# Patient Record
Sex: MALE | Race: WHITE
[De-identification: names, ages, dates, MRNs, and addresses within clinical notes are randomized per-mention and may not be internally consistent; named-entity substitution may affect disease eponyms.]

---

## 2018-04-05 ENCOUNTER — HOSPITAL ENCOUNTER (INPATIENT)
Dept: HOSPITAL 68 - ERH | Age: 58
LOS: 1 days | DRG: 313 | End: 2018-04-06
Attending: INTERNAL MEDICINE | Admitting: INTERNAL MEDICINE
Payer: COMMERCIAL

## 2018-04-05 VITALS — DIASTOLIC BLOOD PRESSURE: 50 MMHG | SYSTOLIC BLOOD PRESSURE: 110 MMHG

## 2018-04-05 VITALS — SYSTOLIC BLOOD PRESSURE: 104 MMHG | DIASTOLIC BLOOD PRESSURE: 56 MMHG

## 2018-04-05 VITALS — HEIGHT: 71 IN | BODY MASS INDEX: 29.12 KG/M2 | WEIGHT: 208 LBS

## 2018-04-05 DIAGNOSIS — K21.9: ICD-10-CM

## 2018-04-05 DIAGNOSIS — D72.829: ICD-10-CM

## 2018-04-05 DIAGNOSIS — R07.89: Primary | ICD-10-CM

## 2018-04-05 DIAGNOSIS — R73.03: ICD-10-CM

## 2018-04-05 DIAGNOSIS — F41.9: ICD-10-CM

## 2018-04-05 DIAGNOSIS — R20.2: ICD-10-CM

## 2018-04-05 DIAGNOSIS — M19.90: ICD-10-CM

## 2018-04-05 LAB
ABSOLUTE GRANULOCYTE CT: 10.1 /CUMM (ref 1.4–6.5)
APTT BLD: 30 SEC (ref 25–37)
BASOPHILS # BLD: 0 /CUMM (ref 0–0.2)
BASOPHILS NFR BLD: 0.1 % (ref 0–2)
EOSINOPHIL # BLD: 0 /CUMM (ref 0–0.7)
EOSINOPHIL NFR BLD: 0.1 % (ref 0–5)
ERYTHROCYTE [DISTWIDTH] IN BLOOD BY AUTOMATED COUNT: 12.6 % (ref 11.5–14.5)
GRANULOCYTES NFR BLD: 86.5 % (ref 42.2–75.2)
HCT VFR BLD CALC: 44.8 % (ref 42–52)
LYMPHOCYTES # BLD: 1.1 /CUMM (ref 1.2–3.4)
MCH RBC QN AUTO: 30 PG (ref 27–31)
MCHC RBC AUTO-ENTMCNC: 34.8 G/DL (ref 33–37)
MCV RBC AUTO: 86.4 FL (ref 80–94)
MONOCYTES # BLD: 0.4 /CUMM (ref 0.1–0.6)
PLATELET # BLD: 227 /CUMM (ref 130–400)
PMV BLD AUTO: 9 FL (ref 7.4–10.4)
PROTHROMBIN TIME: 11.1 SEC (ref 9.4–12.5)
RED BLOOD CELL CT: 5.18 /CUMM (ref 4.7–6.1)
WBC # BLD AUTO: 11.7 /CUMM (ref 4.8–10.8)

## 2018-04-05 NOTE — PN- ATT ADDEND
Attending Addendum
Attending Brief Note
Patient seen and examined.  Pleasant 58-year-old male with no significant 
medical history.  Presents to the emergency room complaining of retrosternal 
discomfort which she describes as burning sensation.  Also treated with symptoms
of bilateral lower chest wall pain.  Symptoms began suddenly this morning.  Did 
not but after taking Tums.  Acute emergency room for further evaluation with 
symptoms persisted despite nitroglycerin and PPI therapy.  EKG showed normal 
sinus rhythm with no obvious ischemic changes.  His first troponin Was negative.
 Patient reports family history of coronary artery disease wasreferred to the 
medical service for further evaluation and management.  On examination is 
resting comfortably multivitamin acute distress.  Chest pain is not 
reproducible.  Heart sounds are regular.  Lungs are clear bilaterally.  Patient 
reports similar pain in the past that improved with the use of Tums.  He admits 
to having a heavy meal last night.  He describes sensation as a burning 
sensation.
 
 
Problems:
1.  Chest pain; likely related to GERD.
 
 
Recommendations:
-Trend cardiac enzymes.
-Obtain echocardiogram to rule out any wall motion abnormality.
-Patient had a nuclear stress test 3 years ago for similar complaints that was 
negative.  Follow-up his cardiologist regarding the need for repeating a nuclear
stress test.
-Cardiac enzymes are negative and following evaluation by his cardiologist 
service tomorrow he may be discharged home with recommendations to follow with 
the gastroenterology service in 2 weeks if symptoms persist despite PPI therapy.
-He is leukocytosis is likely reactive.  Repeat CBC in a.m.

## 2018-04-05 NOTE — RADIOLOGY REPORT
EXAMINATION:
CHEST 2 VIEWS
 
CLINICAL INFORMATION:
Chest pain.
 
COMPARISON:
04/17/2015.
 
TECHNIQUE:
PA and lateral views of the chest were obtained.
 
FINDINGS:
The cardiac silhouette is not enlarged. The mediastinal and hilar contours
are unremarkable. There are neither pleural effusions nor pneumothoraces.
There are no consolidations. The osseous structures are unremarkable.
 
IMPRESSION:
No evidence for acute disease.

## 2018-04-05 NOTE — ED CARDIAC/CP/PALPITATIONS
History of Present Illness
 
General
Chief Complaint: Chest Pain
Stated Complaint: CP
Source: patient
Exam Limitations: no limitations
 
Vital Signs & Intake/Output
Vital Signs & Intake/Output
 Vital Signs
 
 
Date Time Temp Pulse Resp B/P B/P Pulse O2 O2 Flow FiO2
 
     Mean Ox Delivery Rate 
 
 1420  72  118/69     
 
 1350 98.9 82 18 122/71  95 Room Air  
 
 1300 99.0        
 
 1247  72 24 120/74  95 Room Air  
 
 
 
Allergies
Coded Allergies:
NO KNOWN ALLERGIES (16)
  [facility] PER ANITBIOTIC ORDER SHEET -SJS
 
Reconcile Medications
No Known Home Medications
 
Triage Nurses Notes Reviewed? yes
Onset: Gradual
Duration: better
Timing: single episode today
Radiation: back
HPI:
Patient is a 58-year-old male with an unremarkable past medical history presents
emergency room saying that last night he was in his normal state of health this 
morning patient was woke up with mild nausea nausea and which she did eat a half
a bagel prior to going to work patient states that while at work symptoms still 
persisted where he felt cold and clammy patient states that he drank a seltzer 
water and had acute onset approximately 15 minutes later of substernal chest and
throat burning sensation patient then subsequently had chest tightness with 
intermittent back pain described as "shock" symptoms patient took Tums prior to 
arrival with no relief of symptoms, patient states that currently the chest 
tightness is gone however is still complaining of throat and substernal burning 
sensation
Patient denies any alcohol or tobacco use or illicit drug use
Denies any fever chills cough hemoptysis leg swelling shortness of breath or arm
pain jaw pain history of DVT or PE
Patient does have a family history of cardiac disease
 
(Tung Castrejon)
 
Past History
 
Medical History
Any Pertinent Medical History? see below for history
Neurological: PARESTHESIA
Psychiatric: anxiety
Endocrine: BORDERLINE DIABETIC
 
Surgical History
Surgical History: N
 
Psychosocial History
What is your primary language English
 
Family History
Hx Contributory? No
(Tung Castreojn)
 
Review of Systems
 
Review of Systems
Constitutional:
Reports: no symptoms. 
EENTM:
Reports: no symptoms. 
Respiratory:
Reports: see HPI. 
Cardiovascular:
Reports: see HPI. 
GI:
Reports: see HPI, nausea. 
Genitourinary:
Reports: no symptoms. 
Musculoskeletal:
Reports: no symptoms. 
Skin:
Reports: no symptoms. 
Neurological/Psychological:
Reports: no symptoms. 
Hematologic/Endocrine:
Reports: no symptoms. 
Immunologic/Allergic:
Reports: no symptoms. 
All Other Systems: Reviewed and Negative
(Tung Castrejon)
 
Physical Exam
 
Physical Exam
General Appearance: no apparent distress, alert, comfortable
Head: atraumatic
Eyes:
Bilateral: normal appearance, PERRL. 
Ears, Nose, Throat: normal pharynx, normal ENT inspection
Neck: normal inspection
Respiratory: normal breath sounds, chest non-tender, no respiratory distress
Cardiovascular: regular rate/rhythm
Peripheral Pulses:
2+ radial (R)
Gastrointestinal: normal bowel sounds, soft, non-tender
Extremities: normal inspection, normal capillary refill
Skin: intact, normal color, warm/dry
 
Core Measures
ACS in differential dx? Yes
CVA/TIA Diagnosis No
Sepsis Present: No
Sepsis Focused Exam Completed? No
(Tung Castrejon)
 
Progress
Differential Diagnosis: AMI, aortic dissection, atrial fibrillation, 
cholecystitis, CHF/pulm edema, costochondritis, hyperkalemia, hypovolemia, 
hyperthyroid, hyperventilation, intracranial hemorrhage, musculoskeletal pain, 
myocarditis, pancreatitis, pericarditis, pneumonia, pneumothorax, PSVT, 
pulmonary embolism, PUD/GERD, PVCs/PACs, respiratory failure, sepsis, unstable 
angina, V-fib/V-Tach, WPW syndrome
Plan of Care:
 Orders
 
 
Procedure Date/time Status
 
Heart Healthy Diet  D Active
 
Patient Data  1543 Active
 
ED Holding Orders  1542 Active
 
Admit to inpatient  1542 Active
 
Vital Signs  1542 Active
 
Code Status  1542 Active
 
Add-on Test (ER Only)  1353 Active
 
D-DIMER  1248 Complete
 
TROPONIN LEVEL  1245 Complete
 
PARTIAL THROMBOPLASTIN TIME  1245 Complete
 
PROTHROMBIN TIME  1245 Complete
 
LACTIC ACID  1245 Complete
 
D-DIMER  1245 Complete
 
COMPREHENSIVE METABOLIC PANEL  1245 Complete
 
CBC WITHOUT DIFFERENTIAL  1245 Complete
 
TYPE & SCREEN (NOT X-MATCH)  1245 Complete
 
EKG  1235 Active
 
 
 Current Medications
 
 
  Sig/Sarita Start time  Last
 
Medication Dose  Stop Time Status Admin
 
Omeprazole 20 MG DAILY AC  1631 UNVr 
 
(Prilosec)     1640
 
Nitroglycerin 0.4 MG ONCE ONE  1415 CAN 
 
(Nitrostat)    1416  
 
 
 Laboratory Tests
 
 
 
18 1545:
Lactic Acid Cancelled
 
18 1351:
D-Dimer High Sensitivty < 200
 
18 1245:
Anion Gap 14, Estimated GFR > 60, BUN/Creatinine Ratio 22.9, Glucose 111  H, 
Lactic Acid 1.1, Calcium 9.8, Total Bilirubin 1.0, AST 21, ALT 34, Alkaline 
Phosphatase 98, Troponin I < 0.01, Total Protein 7.9, Albumin 4.7, Globulin 3.2,
Albumin/Globulin Ratio 1.5, PT 11.1, INR 1.02, APTT 30, D-Dimer High Sensitivty 
< 200, CBC w Diff MAN DIFF ORDERED, RBC 5.18, MCV 86.4, MCH 30.0, MCHC 34.8, RDW
12.6, MPV 9.0, Gran % 86.5  H, Lymphocytes % 9.7  L, Monocytes % 3.6, 
Eosinophils % 0.1, Basophils % 0.1, Absolute Granulocytes 10.1  H, Absolute 
Lymphocytes 1.1  L, Absolute Monocytes 0.4, Absolute Eosinophils 0, Absolute 
Basophils 0, Platelet Estimate ADEQUATE, Normocytic RBCs VERIFIED, Normochromic 
RBCs VERIFIED
Initial examination patient is resting comfortably at bedside EKG indicates 
status and 73 bpm telemetry monitor placed patient was prophylactically 
administered aspirin due to presentation of chest pain due to onset of 
substernal esophageal and chest burning sensation after drinking seltzer water 
GI cocktail was first given which he had no change in symptoms 1353 
nitroglycerin was administered and will be reevaluated
 
Nitroglycerin was administered patient states that his symptoms were worsened 
nitroglycerin ointment was GIVEN
 
After nitroglycerin was administered patient did have improvement of his 
symptoms however still complaining of 4/10 substernal chest burning pain
 
Dr. Singh discussed patient with Dr. Davis who advised patient to be 
admitted for concerns of angina
 
Discussed admission with patient who is aware
Diagnostic Imaging:
Viewed by Me: Radiology Read. 
CXR Impression: no acute abnormality, no infiltrates
Initial ED EKG: normal p-waves, normal QRS complex, normal sinus rhythm, 73 BPM,
NSR
Comments:
PATIENT: ANJUM CURRAN  MEDICAL RECORD NO: [id number]
PRESENT AGE: 58  PATIENT ACCOUNT NO: [id number]
: 60  LOCATION: ER
ORDERING PHYSICIAN: Tung MARCOS  
 
  SERVICE DATE: 
EXAM TYPE: RAD - XRY-CHEST XRAY, TWO VIEWS
 
EXAMINATION:
CHEST 2 VIEWS
 
CLINICAL INFORMATION:
Chest pain.
 
COMPARISON:
2015.
 
TECHNIQUE:
PA and lateral views of the chest were obtained.
 
FINDINGS:
The cardiac silhouette is not enlarged. The mediastinal and hilar contours
are unremarkable. There are neither pleural effusions nor pneumothoraces.
There are no consolidations. The osseous structures are unremarkable.
 
IMPRESSION:
No evidence for acute disease.
 
DICTATED BY: Raghavendra Bhatia MD 
DATE/TIME DICTATED:18
:RAD.DOMINGO 
DATE/TIME TRANSCRIBED:18
 
CONFIDENTIAL, DO NOT COPY WITHOUT APPROPRIATE AUTHORIZATION.
 
 <Electronically signed in Other Vendor System>                                 
          
                                           SIGNED BY: Raghavendra Bhatia MD 18
 
 
(Tung Castrejon)
 
Departure
 
Departure
Disposition: STILL A PATIENT
Condition: Stable
Clinical Impression
Primary Impression: Angina at rest
Referrals:
Henry Real MD (PCP/Family)
 
Departure Forms:
Customer Survey
General Discharge Information
Prescriptions:
Current Visit Scripts
No Known Home Medications     
 
 
Admission Note
Spoke With:
Yamile Green MD
Documentation of Exam:
Documentation of any treatments & extenuating circumstances including Concerns 
Regarding Discharge (functional status, medication knowledge or non-compliance, 
living conditions, etc.) that warrant an admission rather than observation: [
Patient requires admission for concerns of persistent angina unrelieved with 
nitroglycerin patient requires repeat EKG blood work troponin cardiology 
consultation and telemetry monitoring]
 
(Tung Castrejon)
 
PA/NP Co-Sign Statement
Statement:
ED Attending supervision documentation-
 
[X] I saw and evaluated the patient. I have also reviewed all the pertinent lab 
results and diagnostic results. I agree with the findings and the plan of care 
as documented in the PA's/NP's documentation. 
 
[X] I have reviewed the ED Record and agree with the PA's/NP's documentation.
 
[] Additions or exceptions (if any) to the PAs/NP's note and plan are 
summarized below:
[ADMIT FOR TELE, SEIRAL ENZYMES,CARDIOLOGY CONSULTATION]
 
(Samantha ESTRADA,[employer] FRIEND)
 
Critical Care Note
 
Critical Care Note
Critical Care Time: 30-74 min
(Tung Castrejon)

## 2018-04-05 NOTE — CONS- CARDIOLOGY
General Information and HPI
 
Consulting Request
Date of Consult: 04/05/18
Requested By:
Yamile Green MD
 
History of Present Illness:
Giovani is a 58 year old male with a family history of coronary artery disease. 
He is at least moderately active and at his baseline he is active is free of any
chest pain, pressure, tightness, shortness of breath or lightheadedness. This 
morning, the patient noted nausea followed by diaphoresis. He subsequently felt 
a midsternal burning sensation along with discomfort bilaterally in his lower 
chest. The discomfort radiated to his back. The discomfort is now in his upper 
mid chest at the base of his throat.There is no clear relief by NTG. Otherwise 
he denies shortness of breath, lightheadedness or palpitations.  
 
To review this patient's prior history, Giovani has noted some minor palpitations
for about eight years but they typically only occured on rare occasion. A couple
visits ago he reported having much more prominent palpitations but they seem to 
have almost completely abated. He previously complained of fleeting discomfort 
in a variety of different areas such as his foot to his face. Neurology has not 
been able to discover a reason for these symptoms. In may be recalled that when 
this patient had his prior palpitationsthey were described as intermittent, 
palpitations that were associated with mild shortness of breath and 
lightheadedness. They tend to be short runs that are associated with neck 
pulsation and are described as being fast and even. He did have resolution soon 
after drinking a glass of water.
 
He had an echocardiogram that showed a normal EF of 67% with moderate aortic 
regurgitation, mild to moderate pulmonic regurgitation and borderline LVH. His 
stress test is negative for ischemia.
 
 
 
Allergies/Medications
Allergies:
Coded Allergies:
NO KNOWN ALLERGIES (05/11/16)
  [facility] PER ANITBIOTIC ORDER SHEET -hospitals
 
Home Med List:
No Known Home Medications
 
 
Review of Systems
Review of Systems:
A review of systems is unremarkable.
 
Past History
 
Travel History
Traveled to [place] past 21 day No
 
Medical History
Neurological: PARESTHESIA
EENT: NONE
Cardiovascular: NONE
Respiratory: NONE
Gastrointestinal: NONE
Hepatic: NONE
Renal: NONE
Musculoskeletal: NONE
Psychiatric: anxiety
Endocrine: BORDERLINE DIABETIC
 
Surgical History
Surgical History: none
 
Exam & Diagnostic Data
Vital Signs and I&O
Vital Signs
 
 
Date Time Temp Pulse Resp B/P B/P Pulse O2 O2 Flow FiO2
 
     Mean Ox Delivery Rate 
 
04/05 2034 98.6 65 20 110/50  95 Room Air  
 
04/05 1808      95 Room Air  
 
04/05 1736 99.6        
 
04/05 1714 99.6 84 18 112/67  95 Room Air  
 
04/05 1420  72  118/69     
 
04/05 1350 98.9 82 18 122/71  95 Room Air  
 
04/05 1300 99.0        
 
04/05 1247  72 24 120/74  95 Room Air  
 
 
 Intake & Output
 
 
 04/05 1600 04/05 0800 04/05 0000 04/04 1600 04/04 0800 04/04 0000
 
Intake Total 10     
 
Output Total      
 
Balance 10     
 
       
 
Intake, IV 10     
 
Patient 212 lb     
 
Weight      
 
Weight Reported by Patient     
 
Measurement      
 
Method      
 
 
 
Physical Exam:
General: WD/WN male in NAD; alert and oriented x3
HEENT: NC//AT, PERRL, EOMI
Neck: no JVD, no carotid bruits
Heart: RRR w/o murmur
Lungs: clear bilaterally
Abdomen: soft, NT, +ve bowel sounds
Extremities: no edema
 
Assessment/Plan
Assessment/Plan
* This patient has chest discomfort that is atypical in location without any 
exacerbation by physical exertion or convincing relief by NTG. We will monitor 
him on telelmetry and will rule him out for an MI. If he has recurrent chest 
pain then we will begin IV heparin and will add NTG paste 1/2 in Q 6 hours. If 
the patient rules out and is pain free then we will discharge with a plan for 
outpatient stress testing. If his chest discomfort persists then stress testing 
will be done as an inpatient. 
* Repeat the patient's CBC.
* Continue usual home medications.
 
 
Consult Acknowledgment
- Thank you for your consult request.

## 2018-04-05 NOTE — HISTORY & PHYSICAL
NicholasNovinger 04/05/18 1549:
General Information and HPI
MD Statement:
I have seen and personally examined ANJUM CURRAN and documented this H&P.
 
The patient is a 58 year old M who presented with a patient stated chief 
complaint of chest discomfort, nausea and sweating since this morning.  [].
 
Source of Information: patient, old records
Exam Limitations: no limitations
History of Present Illness:
59 YO M with PMH of acid reflex, anxiety, paresthesia, prediabetic not on any 
medication, osteoarthritis, left knee meniscal tear status post repair and 
colonoscopy in 2008 for rectal bleeding came to ED with chief complaint of chest
discomfort, nausea and sweating since this morning.  Patient reported that he 
was in his usual state of health since this morning when he woke up he noticed 
nausea and sweating.  According to patient he took the shower and went to his 
work where he drank seltzer water and he noticed retrosternal burning sensation 
that's coming to his throat.  He took 2 tums but that didn't relieve his burning
sensation.  Later on patient noticed tightness around his lower chest and also 
shooting pain around his neck.  That tightness was 5/10, nonradiating, 
intermittent, not aggravated with exertion and relieved a little bit with rest. 
Patient also noticed having nausea along with this lower chest tightness.  
Patient decided to leave his work and came to ED for further evaluation.
Patient denied any central chest pain, shortness of breath, palpitation, 
increase in chest tightness with breathing, recent upper respiratory tract 
infection, lightheadedness, vomiting, abdominal pain, diarrhea and dysuria.
Patient also noticed having bloating sensation in his belly.  Patient reported 
that he ate hot dogs and fried chips last night before he slept.  Patient 
reported that similar episode he noticed 3 years back and he took Tums at that 
time and that relieved his burning sensation.  Patient also endorsed that he had
episode of palpitation 2 years back and since then he is seeing Dr. Davis.  He
had 24-hour Holter monitor but nothing was diagnosed at that point.  He also had
stress test in 2016 that was negative.  Last time he saw Dr. Davis in July 2017.  Patient is also seeing Dr. Major for paresthesia around his face and in 
the hands.  Patient attributed this paresthesia to anxiety.  According to 
patient he received nitroglycerin tablet in ED that worsened his retrosternal 
burning sensation.
 
ED course:
Vitals: Temperature 99.0, pulse 72, respiratory rate 24, blood pressure 120/74, 
oxygen saturation 95% room air.
 
Labs: WBC count 11.7, hemoglobin 15.6, hematocrit 44.8, platelet count 227, 
sodium 139, potassium 4.3, BUN 16, creatinine 0.7, anion gap 14, BUNs/creatinine
ratio 22.9, troponin less than 0.01, albumin 4.7, globulin 3.2, PT 11.1, INR 
1.02, d-dimer less than 200
 
Patient received aspirin in ED also nitroglycerin patch and pill.  He also 
received GI cocktail.
 
 
 
 
 
Allergies/Medications
Allergies:
Coded Allergies:
NO KNOWN ALLERGIES (05/11/16)
  NKA PER ANITBIOTIC ORDER SHEET -SJS
 
 
Past History
 
Travel History
Traveled to [place] past 21 day No
 
Medical History
Neurological: PARESTHESIA
EENT: NONE
Cardiovascular: NONE
Respiratory: NONE
Gastrointestinal: NONE
Hepatic: NONE
Renal: NONE
Musculoskeletal: NONE
Psychiatric: anxiety
Endocrine: BORDERLINE DIABETIC
 
Surgical History
Surgical History: N
 
Review of Systems
 
Review of Systems
Constitutional:
Reports: no symptoms. 
EENTM:
Reports: no symptoms. 
Cardiovascular:
Reports: see HPI. 
Respiratory:
Denies: cough, orthopnea, short of breath, sputum production. 
GI:
Denies: bloating, nausea. 
Genitourinary:
Denies: dysuria, hematuria. 
Musculoskeletal:
Reports: see HPI. 
Neurological/Psychological:
Reports: no symptoms. 
 
Exam & Diagnostic Data
Last 24 Hrs of Vital Signs/I&O
 Vital Signs
 
 
Date Time Temp Pulse Resp B/P B/P Pulse O2 O2 Flow FiO2
 
     Mean Ox Delivery Rate 
 
04/05 1420  72  118/69     
 
04/05 1350 98.9 82 18 122/71  95 Room Air  
 
04/05 1300 99.0        
 
04/05 1247  72 24 120/74  95 Room Air  
 
 
 Intake & Output
 
 
 04/05 1600 04/05 0800 04/05 0000
 
Intake Total 10  
 
Output Total   
 
Balance 10  
 
    
 
Intake, IV 10  
 
Patient 212 lb  
 
Weight   
 
Weight Reported by Patient  
 
Measurement   
 
Method   
 
 
 
 
Physical Exam
General Appearance Alert, Oriented X3, Cooperative, No Acute Distress
Skin No Rashes
Skin Temp/Moisture Exam: Warm/Dry
Sepsis Skin Exam (color): Normal for Ethnicity
HEENT Atraumatic, PERRLA, EOMI
Neck Supple
Cardiovascular Normal S1, Normal S2
Lungs Clear to Auscultation
Abdomen Soft, No Tenderness
Neurological Normal Speech, Strength at 5/5 X4 Ext, Normal Tone, Sensation 
Intact
Extremities No Edema
Last 24 Hrs of Labs/Dinh:
 Laboratory Tests
 
04/05/18 1545:
Lactic Acid Cancelled
 
04/05/18 1351:
D-Dimer High Sensitivty < 200
 
04/05/18 1245:
Anion Gap 14, Estimated GFR > 60, BUN/Creatinine Ratio 22.9, Glucose 111  H, 
Lactic Acid 1.1, Calcium 9.8, Total Bilirubin 1.0, AST 21, ALT 34, Alkaline 
Phosphatase 98, Troponin I < 0.01, Total Protein 7.9, Albumin 4.7, Globulin 3.2,
Albumin/Globulin Ratio 1.5, PT 11.1, INR 1.02, APTT 30, D-Dimer High Sensitivty 
< 200, CBC w Diff MAN DIFF ORDERED, RBC 5.18, MCV 86.4, MCH 30.0, MCHC 34.8, RDW
12.6, MPV 9.0, Gran % 86.5  H, Lymphocytes % 9.7  L, Monocytes % 3.6, 
Eosinophils % 0.1, Basophils % 0.1, Absolute Granulocytes 10.1  H, Absolute 
Lymphocytes 1.1  L, Absolute Monocytes 0.4, Absolute Eosinophils 0, Absolute 
Basophils 0, Platelet Estimate ADEQUATE, Normocytic RBCs VERIFIED, Normochromic 
RBCs VERIFIED
 
 
Assessment/Plan
Assessment:
59 YO M with PMH of acid reflex, anxiety, paresthesia, prediabetic not on any 
medication, osteoarthritis, left knee meniscal tear status post repair and 
colonoscopy in 2008 for rectal bleeding came to ED with chief complaint of chest
discomfort, nausea and sweating since this morning.
 
We'll admit the patient on telemetry floor to rule out any ischemic cardiac 
injury.
 
Chest pain:
-Patient has atypical chest pain symptoms but considering his significant family
history we will keep the patient on telemetry floor to rule out any ischemic 
cardiac injury.
-Serial EKG and troponins
-Cardiac consult
-We will start aspirin
-We will start Lipitor
-Lipid panel
-Echocardiogram in a.m.
 
GERD:
-Considering patient's symptoms it's more related to acid reflux as patient has 
past medical history.
-We will start omeprazole
-Elevation of the head end of the bed
-Avoid spicy and fatty food.
 
Leukocytosis:
-Likely reactive 
-we will follow cbc in am.
 
History of prediabetes:
-HbA1c
 
History of anxiety:
-Not on any medication
 
DVT prophylaxis:
Mechanical and subcutaneous Lovenox
 
CODE STATUS:
Full code
 
As Ranked By This Provider
Problem List:
 1. Chest pain
 
 
Core Measures/Misc (9/17)
 
Acute Coronary Syndrome
ACS Diagnosis: No
 
Congestive Heart Failure
Congestive Heart Failure Diagnosis No
 
Cerebrovascular Accident
CVA/TIA Diagnosis: No
 
VTE (View Protocol)
VTE Risk Factors Age>40
No Mechanical VTE Prophylaxis d/t N/A MechProphylax Ordered
No VTE Pharm Prophylaxis d/t NA PharmProphylax ordered
 
Sepsis (View protocol)
Sepsis Present: No
 
[facility] 04/05/18 1720:
General Information and HPI
 
Allergies/Medications
Home Med list
Omeprazole 20 MG CAPSULE.DR Tan CAP PO DAILY GERD
 
 
 
Resident Review Statement
Resident Statement: examined this patient, discussed with intern, agreed with 
intern, discussed with family
Other Findings:
Mr. Curran is a 58-year-old male with past medical history significant for 
borderline diabetes not on any medication, anxiety associated with episodic 
paresthesia was at some point on Xanax, who presented to ED with chief complaint
of burning chest pain for 1 day.  Patient reported that after working up this 
morning he feels nauseated but no vomiting, and went to work and had some 
seltzer water.  After drinking that he started to have burning sensation in his 
chest associated with bilateral lower rib tightness not associated with 
shortness of breath, palpitation, pressure or sharp chest pain, no nausea or 
vomiting.  Patient reported having similar burdening chest pain in the past that
improved with Tums however this time he tried times without any relieving of his
symptoms.
In 2016 patient had episodic palpitation and had a follow-up with Dr. Davis, 
reported having Holter monitor, nuclear stress test and echocardiogram with 
negative results.  Patient got anxious today because of the bilateral lower 
chest tightness and decided to come to ED for evaluation given the fact that has
positive family history of MI.  Patient reported having fried food last night. 
 
Initial troponin negative, EKG IA interval 204, QTc 401, single mild ST 
depression in lead V4 .
 
Problem list
#Chest burning sensation
#GERD
#Anxiety
 
Plan
-Admit to telemetry floor
-Tropes and EKG 2
-Vital signs every shift
-Protonix 20 mg before meals daily
-CBC and BEP in a.m.
-Cardiology evaluation
-DVT prophylaxis Lovenox
-Diet regular
-Code full

## 2018-04-06 VITALS — SYSTOLIC BLOOD PRESSURE: 122 MMHG | DIASTOLIC BLOOD PRESSURE: 62 MMHG

## 2018-04-06 LAB
ABSOLUTE GRANULOCYTE CT: 4 /CUMM (ref 1.4–6.5)
BASOPHILS # BLD: 0 /CUMM (ref 0–0.2)
BASOPHILS NFR BLD: 0.2 % (ref 0–2)
EOSINOPHIL # BLD: 0 /CUMM (ref 0–0.7)
EOSINOPHIL NFR BLD: 0.7 % (ref 0–5)
ERYTHROCYTE [DISTWIDTH] IN BLOOD BY AUTOMATED COUNT: 13 % (ref 11.5–14.5)
GRANULOCYTES NFR BLD: 68 % (ref 42.2–75.2)
HCT VFR BLD CALC: 40.9 % (ref 42–52)
LYMPHOCYTES # BLD: 1.3 /CUMM (ref 1.2–3.4)
MCH RBC QN AUTO: 30.5 PG (ref 27–31)
MCHC RBC AUTO-ENTMCNC: 34.6 G/DL (ref 33–37)
MCV RBC AUTO: 88.1 FL (ref 80–94)
MONOCYTES # BLD: 0.5 /CUMM (ref 0.1–0.6)
PLATELET # BLD: 195 /CUMM (ref 130–400)
PMV BLD AUTO: 9 FL (ref 7.4–10.4)
RED BLOOD CELL CT: 4.64 /CUMM (ref 4.7–6.1)
WBC # BLD AUTO: 5.9 /CUMM (ref 4.8–10.8)

## 2018-04-06 NOTE — DISCHARGE SUMMARY
Visit Information
 
Visit Dates
Admission Date:
04/05/18
 
Discharge Date:
04/06/18
 
 
Hospital Course
 
Course
Attending Physician:
Meghan Marie MD
 
Primary Care Physician:
Henry Real MD
 
Hospital Course:
57 YO M with PMH of acid reflex, anxiety, paresthesia, prediabetic not on any 
medication, osteoarthritis, left knee meniscal tear status post repair and 
colonoscopy in 2008 for rectal bleeding came to ED with chief complaint of chest
discomfort, nausea and sweating since this morning.
 
ED course:
Vitals: Temperature 99.0, pulse 72, respiratory rate 24, blood pressure 120/74, 
oxygen saturation 95% room air.
 
Labs: WBC count 11.7, hemoglobin 15.6, hematocrit 44.8, platelet count 227, 
sodium 139, potassium 4.3, BUN 16, creatinine 0.7, anion gap 14, BUNs/creatinine
ratio 22.9, troponin less than 0.01, albumin 4.7, globulin 3.2, PT 11.1, INR 
1.02, d-dimer less than 200
Patient received aspirin in ED also nitroglycerin patch and pill.  He also 
received GI cocktail.
 
Atypical Chest pain:
Patient was admitted with atypical chest pain.  Serial EKGs and troponins were 
negative.  Cardiology consult was obtained.  Cardiology recommended to do 
outpatient stress test.  Patient was instructed to follow cardiology as 
outpatient for further recommendations and for cardiac stress test.  Patient 
remained asymptomatic during hospital stay.
 
GERD:
Patient symptoms were more explainable with GERD.  Patient was treated with PPI 
and Tums.  Patient was instructed to avoid spicy and fatty foods.  He will 
continue PPI for GERD and he was instructed to follow primary care physician.
 
Leukocytosis:
His leukocytosis was reactive that was monitored.  Before discharge his WBC 
count came back to normal.
 
History of prediabetes:
His HbA1c was 5.9.  Patient was instructed to follow primary care physician for 
further recommendations.
 
History of anxiety:
Was not on any medications.
 
DVT prophylaxis:
Mechanical and subcutaneous Lovenox
 
CODE STATUS:
Full code
Allergies:
Coded Allergies:
NO KNOWN ALLERGIES (05/11/16)
  [facility] PER ANITBIOTIC ORDER SHEET -SJS
 
Pertinent Lab Results:
Chest x-ray on 04/05/2018:
IMPRESSION:
No evidence for acute disease.
WBC count 5.9, hemoglobin 14.1, hematocrit 40.9, platelet count 195, sodium 137,
potassium 4.1, BUN 16, creatinine 0.7, HbA1c 5.9
 
Disposition Summary
 
Disposition
Principal Diagnosis:
Atypical chest pain
GERD
Additional Diagnosis:
History of anxiety
Discharge Disposition: home or self care
 
Discharge Instructions
 
General Discharge Information
Code Status: Full Code
Patient's Diet:
Regular diet
Patient's Activity:
Self-limited
Follow-Up Instructions/Appts:
Follow up with Primary care physician in one week
Follow-up with him her cardiologist in 1 week and discuss what the cardiac 
stress test.
 
Medications at Discharge
Discharge Medications:
Start taking the following new medications:
Omeprazole (Omeprazole) 20 MG CAPSULE.
    1 Capsule ORAL DAILY
    Qty = 30
    No Refills
    Comments:
       Last Taken:4/6/17
             Time:0630
 
 
Copies To:
Farhan ESTRADA,Henry Davis MD PHD,[facility] FONSE[facility]

## 2018-04-06 NOTE — PATIENT DISCHARGE INSTRUCTIONS
Discharge Instructions
 
General Discharge Information
You were seen/treated for:
Atypical chest pain
GERD
Watch for these problems:
Chest pain, shortness of breath, lightheadedness, palpitation, indigestion and 
abdominal pain.
If you experience any of these symptoms please call to her primary care 
physician or come to ED.
Special Instructions:
-Follow-up with your primary care physician in one week.
-Follow up with your cardiologist in 1 week and discuss about outpatient cardiac
stress test.
 
Diet
Recommended Diet: Regular
 
Activity
Activity Self Limited: Yes
 
Acute Coronary Syndrome
 
Inclusion Criteria
At DC or during hospital stay patient has or had the following:
ACS DIAGNOSIS No
 
Discharge Core Measures
Meds if any: Prescribed or Continued at Discharge
Meds if any: NOT Prescribed or Continued at Discharge
 
Congestive Heart Failure
 
Inclusion Criteria
At DC or during hospital stay patient has or had the following:
CHF DIAGNOSIS No
 
Discharge Core Measures
Meds if any: Prescribed or Continued at Discharge
Meds if any: NOT Prescribed or Continued at Discharge
 
Cerebrovascular accident
 
Inclusion Criteria
At DC or during hospital stay patient has or had the following:
CVA/TIA Diagnosis No
 
Discharge Core Measures
Meds if any: Prescribed or Continued at Discharge
Meds if any: NOT Prescribed or Continued at Discharge
 
Venous thromboembolism
 
Inclusion Criteria
VTE Diagnosis No
VTE Type NONE
VTE Confirmed by (Test) NONE
 
Discharge Core Measures
- Per Current guidelines, there needs to be overlap
- treatment for the first 5 days of Warfarin therapy.
- If discharged on Warfarin prior to 5 days of
- overlap therapy, the patient will need to be
- assessed for post discharge needs including
- *Post discharge parental anticoagulation
- *Warfarin and/or parental anticoagulation education
- *Follow up date to check INR post discharge
At least 5 days overlap therapy as Inpatient No
Meds if any: Prescribed or Continued at Discharge
Note: Overlap Therapy is Warfarin and Anticoagulant
Meds if any: NOT Prescribed or Continued at Discharge

## 2018-04-06 NOTE — PN- HOUSESTAFF
[facility]
Subjective
Follow-up For:
Atypical chest pain
GERD
Tele-Events Since Last Visit:
Sinus rhythm with heart rate 6591
Subjective:
Patient remained afebrile overnight.  Seen and examined this morning.  Denied 
any chest pain, short of breath, nausea, vomiting, chills, fever, abdominal pain
and dysuria.  Patient reported having acid reflux last night and it was relieved
with TUMS.
 
Review of Systems
Constitutional:
Denies: chills, fever, weakness. 
EENTM:
Reports: no symptoms. 
Cardiovascular:
Denies: chest pain, orthopena, palpitations. 
Respiratory:
Denies: cough, short of breath. 
Gastrointestinal:
Denies: bloating, diarrhea, nausea. 
Genitourinary:
Reports: no symptoms. 
Musculoskeletal:
Reports: no symptoms. 
Neurological/Psychological:
Reports: no symptoms. 
 
Objective
Last 24 Hrs of Vital Signs/I&O
 Vital Signs
 
 
Date Time Temp Pulse Resp B/P B/P Pulse O2 O2 Flow FiO2
 
     Mean Ox Delivery Rate 
 
 0632 98.6 64 20 122/62  95 Room Air  
 
04/05 2350 98.2 67 12 104/56  94 Room Air  
 
04/05 2034 98.6 65 20 110/50  95 Room Air  
 
04/05 1808      95 Room Air  
 
04/05 1736 99.6        
 
04/05 1714 99.6 84 18 112/67  95 Room Air  
 
04/05 1420  72  118/69     
 
04/05 1350 98.9 82 18 122/71  95 Room Air  
 
04/05 1300 99.0        
 
04/05 1247  72 24 120/74  95 Room Air  
 
 
 Intake & Output
 
 
 / 1600 /06 0800 04/06 0000
 
Intake Total  110 320
 
Output Total  500 
 
Balance  -390 320
 
    
 
Intake, Oral  110 320
 
Output, Urine  500 
 
Patient   208 lb
 
Weight   
 
Weight   Bed scale
 
Measurement   
 
Method   
 
 
 
 
Physical Exam
General Appearance: Alert, Oriented X3, Cooperative
Skin: No Rashes
Skin Temp/Moisture Exam: Warm/Dry
Sepsis Skin Exam (color): Normal for Ethnicity
HEENT: Atraumatic, PERRLA, EOMI
Neck: Supple
Cardiovascular: Normal S1, Normal S2
Lungs: Clear to Auscultation
Abdomen: Soft, No Tenderness
Neurological: Normal Speech, Strength at 5/5 X4 Ext, Normal Tone, Sensation 
Intact
Extremities: No Edema
 
Assessment/Plan
Assessment:
59 YO M with PMH of acid reflex, anxiety, paresthesia, prediabetic not on any 
medication, osteoarthritis, left knee meniscal tear status post repair and 
colonoscopy in  for rectal bleeding came to ED with chief complaint of chest
discomfort, nausea and sweating since this morning.
 
We are following the patient on telemetry floor for following problems:
 
Atypical Chest pain:
-Patient has atypical chest pain symptoms but considering his significant family
history we will keep the patient on telemetry floor to rule out any ischemic 
cardiac injury.
-EKGs and trops are Negative
-Cardiology will follow the patient as outpatient for stress test.
-Echocardiogram in a.m.
 
GERD:
-Considering patient's symptoms it's more related to acid reflux as patient has 
past medical history.
-We will start omeprazole
-Elevation of the head end of the bed
-Avoid spicy and fatty food.
 
Leukocytosis:
-Likely reactive 
-We'll see count is back to normal
 
History of prediabetes:
-HbA1c 5.9
 
History of anxiety:
-Not on any medication
 
DVT prophylaxis:
Mechanical and subcutaneous Lovenox
 
CODE STATUS:
Full code
Problem List:
 1. Chest pain
 
Pain Ratin
Pain Location:
none
Pain Goal: Remain pain free
Pain Plan:
pain pathway
Tomorrow's Labs & Rationales:
none
 
 
Meghan Marie MD 18 1116:
Attending MD Review Statement
 
Attending Statement
Attending MD Statement: examined this patient, discuss w/resident/PA/NP, agreed 
w/resident/PA/NP, reviewed EMR data (avail)
Attending Assessment/Plan:
Symptoms have resolved.  Yesterday felt epigastric and rib discomfort, but has 
had no symptoms since.  EKG and troponin are negative.  Seen by cardiology.  
Patient is stable for discharge home on Omeprazole with outpatient cardiology 
follow up.

## 2018-04-06 NOTE — PN- STUDENT
Subjective
Subjective:
59 y/o M with PMHx of acid reflux, anxiey, paresthesia, prediabetic (without any
medication), left knee meniscal tear s/p repair and colonoscopy in 2008 for 
rectal bleeding. Patient presented to the ED with a chief complain of chest 
discomfort. Patient was in his usual state of health until the morning PTA, when
the wake up with nausea. During the day, the nausea resolved but in the way to 
the work he experienced chest discomfort, located on the middle of the chest 
with duration between 10 -15 minutes and described as burning sensation and 
"huggin like pressure in my rib cage". Patient reported radiation to the left 
shoulder, severity of 5/10, and associated symptoms of nausea without 
agraviation of the pain with excertion. Denied fever, vomiting, diarrhea, SOB, 
metalic taste in the mouth. During the ED workup he received nitroglycerin 
tablets which didnot resolved the pain and made it worse. Also, during the day 
patient noticed bloating sensation and passing more gasses than usual. The night
before the onser of the pain he ate hotdogs and chips before bedtime. He has 
experienced this pain befor and had resolved with antiacids medicationn but has 
never expirienced shouldr pain. This time the antiacid medication did not 
resolved the pain. patient is been seen by Dr. Davis for card eval and workup.
Last time seen in July 2017. 
 
Objective
Objective:
 
Vitals: 
t= 98.9 HR= 72 BP= 118/69 O2 Sat= 95
 
PE:
General= alert, oriented x3, without any distress
HEET= no lymphadenopathy, no ulcers in the mouth, good dental ingine
Lungs: clean bilateral, no added sounds
CVS= regular rate rythm, no murmurs S3 or S4
Abdomen= no scar discolorations or hernias visible. Present bowel sounds. No 
tender, non distender, no masses.
 
Results
Results:
Laboratory Tests
 
04/06/18 0655:
Anion Gap 10, Estimated GFR > 60, BUN/Creatinine Ratio 22.9, CBC w Diff NO MAN 
DIFF REQ, RBC 4.64  L, MCV 88.1, MCH 30.5, MCHC 34.6, RDW 13.0, MPV 9.0, Gran % 
68.0, Lymphocytes % 21.8, Monocytes % 9.3, Eosinophils % 0.7, Basophils % 0.2, 
Absolute Granulocytes 4.0, Absolute Lymphocytes 1.3, Absolute Monocytes 0.5, 
Absolute Eosinophils 0, Absolute Basophils 0
 
04/05/18 1934:
Troponin I < 0.01
 
04/05/18 1545:
Lactic Acid Cancelled
 
04/05/18 1351:
D-Dimer High Sensitivty < 200
 
04/05/18 1245:
Anion Gap 14, Estimated GFR > 60, BUN/Creatinine Ratio 22.9, Glucose 111  H, 
Hemoglobin A1c Pending, Lactic Acid 1.1, Calcium 9.8, Phosphorus 3.7, Magnesium 
1.9, Total Bilirubin 1.0, AST 21, ALT 34, Alkaline Phosphatase 98, Troponin I < 
0.01, Total Protein 7.9, Albumin 4.7, Globulin 3.2, Albumin/Globulin Ratio 1.5, 
Vitamin B12 361, TSH 0.859, PT 11.1, INR 1.02, APTT 30, D-Dimer High Sensitivty 
< 200, CBC w Diff MAN DIFF ORDERED, RBC 5.18, MCV 86.4, MCH 30.0, MCHC 34.8, RDW
12.6, MPV 9.0, Gran % 86.5  H, Lymphocytes % 9.7  L, Monocytes % 3.6, 
Eosinophils % 0.1, Basophils % 0.1, Absolute Granulocytes 10.1  H, Absolute 
Lymphocytes 1.1  L, Absolute Monocytes 0.4, Absolute Eosinophils 0, Absolute 
Basophils 0, Platelet Estimate ADEQUATE, Normocytic RBCs VERIFIED, Normochromic 
RBCs VERIFIED
 
 
 
Assessment/Plan
Assessment:
A: 59 y/o M with PMHx of acid reflux and positive FMHx for heart attack. Based 
on the troponin and EKG an ischemic heart diseases is unlikely, but further 
investigations are recommended. History and physical examination are consistent 
with acid reflux.
 
  
Plan:
Chest pain:
-monitor troponin
-cont. telemetry
-Aspirin and lipitor based on cardiovascular risk
-ECHO 
 
Acid Reflux:
-Omeprazole
-avoid spicy food, caffeine, alcohol
 
Prediabetic:
-HbA1C for prediabetic
 
Leukocytosis:
-probably reactive
-monitor with CBC

## 2018-04-06 NOTE — PN- CARDIOLOGY
Subjective
Subjective:
* Mild chest discomfort that was exacerbated by eating. Overall her feels 
improved.
* sinus rhythm
* normal cardiac enzymes
* normalized WBC count
 
Objective
Vital Signs and I&Os
Vital Signs
 
 
Date Time Temp Pulse Resp B/P B/P Pulse O2 O2 Flow FiO2
 
     Mean Ox Delivery Rate 
 
04/06 0632 98.6 64 20 122/62  95 Room Air  
 
04/05 2350 98.2 67 12 104/56  94 Room Air  
 
04/05 2034 98.6 65 20 110/50  95 Room Air  
 
04/05 1808      95 Room Air  
 
04/05 1736 99.6        
 
04/05 1714 99.6 84 18 112/67  95 Room Air  
 
04/05 1420  72  118/69     
 
04/05 1350 98.9 82 18 122/71  95 Room Air  
 
04/05 1300 99.0        
 
04/05 1247  72 24 120/74  95 Room Air  
 
 
 Intake & Output
 
 
 04/06 1600 04/06 0800 04/06 0000 04/05 1600 04/05 0800 04/05 0000
 
Intake Total  110 320 10  
 
Output Total  500    
 
Balance  -390 320 10  
 
       
 
Intake, IV    10  
 
Intake, Oral  110 320   
 
Output, Urine  500    
 
Patient   208 lb 212 lb  
 
Weight      
 
Weight   Bed scale Reported by Patient  
 
Measurement      
 
Method      
 
 
 
Physical Exam:
General: WD/WN male in NAD; alert and oriented x3
Neck: no JVD, no carotid bruits
Heart: RRR w/o murmur
Lungs: clear bilaterally
Abdomen: soft, NT, +ve bowel sounds
Extremities: no edema
 
Assessment/Plan
Assessment/Plan
* Giovani feels improved. He did have a prolonged episode of atypical discomfort 
yesterday without electrocardiographic changes or rise in cardiac enzymes. He 
now feels much improved on antacid therapy although he did feel some slight 
discomfort after eating this morning. I have a very low suspicion of angina or 
myocardial ischemia. It is reasonable to discharge this patient to home on his 
usual drug regimen and a PPI. Follow up in the office in a week or two. 
Continue telemetry? No